# Patient Record
Sex: MALE | Race: WHITE | URBAN - METROPOLITAN AREA
[De-identification: names, ages, dates, MRNs, and addresses within clinical notes are randomized per-mention and may not be internally consistent; named-entity substitution may affect disease eponyms.]

---

## 2024-05-29 ENCOUNTER — OFFICE VISIT (OUTPATIENT)
Dept: URBAN - METROPOLITAN AREA CLINIC 19 | Facility: CLINIC | Age: 19
End: 2024-05-29

## 2024-11-26 ENCOUNTER — OFFICE VISIT (OUTPATIENT)
Dept: URBAN - METROPOLITAN AREA CLINIC 19 | Facility: CLINIC | Age: 19
End: 2024-11-26
Payer: COMMERCIAL

## 2024-11-26 ENCOUNTER — TELEPHONE ENCOUNTER (OUTPATIENT)
Dept: URBAN - METROPOLITAN AREA CLINIC 19 | Facility: CLINIC | Age: 19
End: 2024-11-26

## 2024-11-26 ENCOUNTER — DASHBOARD ENCOUNTERS (OUTPATIENT)
Age: 19
End: 2024-11-26

## 2024-11-26 VITALS
BODY MASS INDEX: 24.44 KG/M2 | WEIGHT: 165 LBS | SYSTOLIC BLOOD PRESSURE: 154 MMHG | HEIGHT: 69 IN | DIASTOLIC BLOOD PRESSURE: 92 MMHG | TEMPERATURE: 97.5 F | HEART RATE: 70 BPM

## 2024-11-26 DIAGNOSIS — K52.9 CHRONIC DIARRHEA: ICD-10-CM

## 2024-11-26 DIAGNOSIS — R14.0 BLOATING: ICD-10-CM

## 2024-11-26 PROCEDURE — 99204 OFFICE O/P NEW MOD 45 MIN: CPT | Performed by: INTERNAL MEDICINE

## 2024-11-26 NOTE — HPI-TODAY'S VISIT:
11/26/24 (Dr. Severiano Sierra):    Patient presents for evaluation of abnormal bowel habits. He states that for the past year or so, he wakes up in the morning and has a bowel movement, followed by an intense bowel urgency multiple times in the morning (for about 2 hours), forcing him to return to the commode, up to 6 times. His stools, however, are [place] 5 to 7 in consistency and small. No blood or mucus in stool. No abdominal cramping/pain. He has stopped drinking coffee and decreased his dose of Vyvanse (lisdexamfetamine for ADD/ADHD). He has intermittent bloating/gas issues as well. He has increased fiber and taken GI Health supplements. He has not undergone any testing - he was concerned about possible Crohn's disease.

## 2024-11-27 LAB
A/G RATIO: 2.3
ALBUMIN: 5.4
ALKALINE PHOSPHATASE: 43
ALT (SGPT): 34
AST (SGOT): 18
BILIRUBIN, TOTAL: 0.6
BUN/CREATININE RATIO: (no result)
BUN: 13
C-REACTIVE PROTEIN, QUANT: <3
CALCIUM: 10.4
CARBON DIOXIDE, TOTAL: 27
CHLORIDE: 102
CREATININE: 0.86
EGFR: 126
GLOBULIN, TOTAL: 2.3
GLUCOSE: 90
HEMATOCRIT: 47.9
HEMOGLOBIN: 16.3
IMMUNOGLOBULIN A, QN, SERUM: 57
INTERPRETATION: (no result)
MCH: 32.9
MCHC: 34
MCV: 96.8
MPV: 11.8
PLATELET COUNT: 251
POTASSIUM: 4
PROTEIN, TOTAL: 7.7
RDW: 11.5
RED BLOOD CELL COUNT: 4.95
SED RATE BY MODIFIED: 2
SODIUM: 139
T-TRANSGLUTAMINASE (TTG) IGA: <1
TSH W/REFLEX TO FT4: 1.21
WHITE BLOOD CELL COUNT: 4.9